# Patient Record
Sex: MALE | Race: WHITE | HISPANIC OR LATINO | Employment: UNEMPLOYED | URBAN - METROPOLITAN AREA
[De-identification: names, ages, dates, MRNs, and addresses within clinical notes are randomized per-mention and may not be internally consistent; named-entity substitution may affect disease eponyms.]

---

## 2018-04-02 ENCOUNTER — HOSPITAL ENCOUNTER (EMERGENCY)
Facility: MEDICAL CENTER | Age: 58
End: 2018-04-02
Attending: EMERGENCY MEDICINE

## 2018-04-02 VITALS
HEIGHT: 68 IN | OXYGEN SATURATION: 98 % | DIASTOLIC BLOOD PRESSURE: 109 MMHG | TEMPERATURE: 98.3 F | BODY MASS INDEX: 35.68 KG/M2 | SYSTOLIC BLOOD PRESSURE: 186 MMHG | WEIGHT: 235.45 LBS | RESPIRATION RATE: 16 BRPM | HEART RATE: 63 BPM

## 2018-04-02 DIAGNOSIS — I16.0 HYPERTENSIVE URGENCY: ICD-10-CM

## 2018-04-02 PROCEDURE — 700102 HCHG RX REV CODE 250 W/ 637 OVERRIDE(OP): Performed by: EMERGENCY MEDICINE

## 2018-04-02 PROCEDURE — 99283 EMERGENCY DEPT VISIT LOW MDM: CPT

## 2018-04-02 PROCEDURE — A9270 NON-COVERED ITEM OR SERVICE: HCPCS | Performed by: EMERGENCY MEDICINE

## 2018-04-02 RX ORDER — CLONIDINE HYDROCHLORIDE 0.1 MG/1
0.2 TABLET ORAL ONCE
Status: COMPLETED | OUTPATIENT
Start: 2018-04-02 | End: 2018-04-02

## 2018-04-02 RX ORDER — CLONIDINE HYDROCHLORIDE 0.2 MG/1
0.2 TABLET ORAL
Qty: 30 TAB | Refills: 0 | Status: SHIPPED | OUTPATIENT
Start: 2018-04-02 | End: 2018-05-02

## 2018-04-02 RX ADMIN — CLONIDINE HYDROCHLORIDE 0.2 MG: 0.1 TABLET ORAL at 13:30

## 2018-04-02 ASSESSMENT — PAIN SCALES - GENERAL: PAINLEVEL_OUTOF10: 4

## 2018-04-02 NOTE — ED TRIAGE NOTES
Ambulates to triage  Chief Complaint   Patient presents with   • Hypertension     has been taking his BP meds, has a headache, no vision changes     Denies any CP, took his meds this morning, has not had any changes in meds recently.

## 2018-04-02 NOTE — DISCHARGE INSTRUCTIONS
Hipertensión  (Hypertension)  La hipertensión, conocida comúnmente yoko presión arterial jazzmine, se produce cuando la jef bombea en las arterias con mucha fuerza. Las arterias son los vasos sanguíneos que transportan la jef desde el corazón hacia todas las partes del cuerpo. Haleigh lectura de la presión arterial consiste en un número más alto sobre un número más bajo, por ejemplo, 110/72. El número más alto (presión sistólica) corresponde a la presión interna de las arterias cuando el corazón bombea jef. El número más bajo (presión diastólica) corresponde a la presión interna de las arterias cuando el corazón se relaja. En condiciones ideales, la presión arterial debe ser inferior a 120/80.  La hipertensión fuerza al corazón a trabajar más para bombear la jef. Las arterias pueden estrecharse o ponerse rígidas. La hipertensión no tratada o no controlada puede causar infarto de miocardio, ictus, enfermedad renal y otros problemas.  FACTORES DE RIESGO  Algunos factores de riesgo de hipertensión son controlables, inna otros no lo son.  Entre los factores de riesgo que usted no puede controlar, se incluyen los siguientes:  · La braeden. El riesgo es mayor para las personas afroamericanas.  · La edad. Los riesgos aumentan con la edad.  · El sexo. Antes de los 45 años, los hombres corren más riesgo que las mujeres. Después de los 65 años, las mujeres corren más riesgo que los hombres.  Entre los factores de riesgo que usted puede controlar, se incluyen los siguientes:  · No hacer la cantidad suficiente de actividad física o ejercicio.  · Tener sobrepeso.  · Consumir mucha grasa, azúcar, calorías o sal en la dieta.  · Beber alcohol en exceso.  SIGNOS Y SÍNTOMAS  Por lo general, la hipertensión no causa signos o síntomas. La hipertensión arterial demasiado jazzmine (crisis hipertensiva) puede causar dolor de regina, ansiedad, falta de aire y hemorragia nasal.  DIAGNÓSTICO  Para detectar si usted tiene hipertensión, el médico  le medirá la presión arterial mientras esté sentado, con el brazo levantado a la altura del corazón. Debe medirla al menos dos veces en el mismo brazo. Determinadas condiciones pueden causar shelby diferencia de presión arterial entre el brazo lisandro y el derecho. El hecho de tener shelby bridgette lectura de la presión arterial más jazzmine que lo normal no significa que necesita un tratamiento. Si no está nikki si tiene hipertensión arterial, es posible que se le pida que regrese otro día para volver a controlarle la presión arterial. O maría se le puede pedir que se controle la presión arterial en crawford casa blanca 1 o más meses.  TRATAMIENTO  El tratamiento de la hipertensión arterial incluye hacer cambios en el estilo de rossy y, posiblemente, sierra medicamentos. Un estilo de rossy saludable puede ayudar a bajar la presión arterial jazzmine. Quizá deba cambiar algunos hábitos.  Los cambios en el estilo de rossy pueden incluir lo siguiente:  · Seguir la dieta DASH. Esta dieta tiene un alto contenido de frutas, verduras y cereales integrales. Incluye poca cantidad de sal, olivia powell y azúcares agregados.  · Mantenga el consumo de sodio por debajo de 2 300 mg por día.  · Realizar al menos entre 30 y 45 minutos de ejercicio aeróbico, 4 veces por semana yoko mínimo.  · Perder peso, si es necesario.  · No fumar.  · Limitar el consumo de bebidas alcohólicas.  · Aprender formas de reducir el estrés.  El médico puede recetarle medicamentos si los cambios en el estilo de rossy no son suficientes para lograr controlar la presión arterial y si shelby de las siguientes afirmaciones es verdadera:  · Tiene entre 18 y 59 años y crawford presión arterial sistólica está por encima de 140.  · Tiene 60 años o más y crawford presión arterial sistólica está por encima de 150.  · Crawford presión arterial diastólica está por encima de 90.  · Tiene diabetes y crawford presión arterial sistólica está por encima de 140 o crawford presión arterial diastólica está por encima de 90.  · Tiene  shelby enfermedad renal y myrick presión arterial está por encima de 140/90.  · Tiene shelby enfermedad cardíaca y ymrick presión arterial está por encima de 140/90.  La presión arterial deseada puede variar en función de las enfermedades, la edad y otros factores personales.  INSTRUCCIONES PARA EL CUIDADO EN EL HOGAR  · Judi que le midan de nuevo la presión arterial según las indicaciones del médico.  · Onamia los medicamentos solamente yoko se lo haya indicado el médico. Siga cuidadosamente las indicaciones. Los medicamentos para la presión arterial deben tomarse según las indicaciones. Los medicamentos pierden eficacia al omitir las dosis. El hecho de omitir las dosis también aumenta el riesgo de otros problemas.  · No fume.  · Contrólese la presión arterial en myrick casa según las indicaciones del médico.  SOLICITE ATENCIÓN MÉDICA SI:  · Piensa que tiene shelby reacción alérgica a los medicamentos.  · Tiene mareos o liz de regina con recurrencia.  · Tiene hinchazón en los tobillos.  · Tiene problemas de visión.  SOLICITE ATENCIÓN MÉDICA DE INMEDIATO SI:  · Siente un dolor de regina intenso o confusión.  · Siente debilidad inusual, adormecimiento o que se desmayará.  · Siente dolor intenso en el pecho o en el abdomen.  · Vomita repetidas veces.  · Tiene dificultad para respirar.  ASEGÚRESE DE QUE:  · Comprende estas instrucciones.  · Controlará myrick afección.  · Recibirá ayuda de inmediato si no mejora o si empeora.  Esta información no tiene yoko fin reemplazar el consejo del médico. Asegúrese de hacerle al médico cualquier pregunta que tenga.  Document Released: 12/18/2006 Document Revised: 05/03/2016 Document Reviewed: 10/10/2014  [employer] Interactive Patient Education © 2017 [employer]

## 2018-04-02 NOTE — ED PROVIDER NOTES
"ED Provider Note    CHIEF COMPLAINT  Chief Complaint   Patient presents with   • Hypertension     has been taking his BP meds, has a headache, no vision changes       HPI  Jean Fowler is a 57 y.o. male who presents for evaluation of hypertension. Patient is primarily [place]i-speaking and his wife is here and is working as an . He has history of hypertension. He has been on lisinopril and metoprolol for approximately 8 years. He's been checking his blood pressure according to his wife at times it's been quite elevated with a systolic above 200. He has a very slight headache. Denies visual changes. He has no chest pain or shortness of breath. He is followed at the [facility]. He states that he has not been to see them for a while. He has been taking his lisinopril and metoprolol as prescribed.    REVIEW OF SYSTEMS  See HPI for further details. All other systems are negative.     PAST MEDICAL HISTORY  No past medical history on file.    FAMILY HISTORY  No family history on file.    SOCIAL HISTORY  Social History     Social History   • Marital status: N/A     Spouse name: N/A   • Number of children: N/A   • Years of education: N/A     Social History Main Topics   • Smoking status: Not on file   • Smokeless tobacco: Not on file   • Alcohol use Not on file   • Drug use: Unknown   • Sexual activity: Not on file     Other Topics Concern   • Not on file     Social History Narrative   • No narrative on file       SURGICAL HISTORY  No past surgical history on file.    CURRENT MEDICATIONS  Home Medications    **Home medications have not yet been reviewed for this encounter**         ALLERGIES  No Known Allergies    PHYSICAL EXAM  VITAL SIGNS: BP (!) 186/109   Pulse 72   Temp 36.8 °C (98.3 °F)   Resp 16   Ht 1.727 m (5' 8\")   Wt 106.8 kg (235 lb 7.2 oz)   SpO2 96%   BMI 35.80 kg/m²     Constitutional: Well developed, Well nourished, No acute distress, Non-toxic appearance.   HENT: Normocephalic, " Atraumatic.   Eyes:  EOMI, Conjunctiva normal, No discharge.   Cardiovascular: Normal heart rate, Normal rhythm, No murmurs, No rubs, No gallops.   Thorax & Lungs: Lungs clear to auscultation bilaterally without wheezes, rales or rhonchi. No respiratory distress.   Skin: Warm, Dry.   Extremities:  No edema, No cyanosis. No calf tenderness or swelling.  Musculoskeletal: Good range of motion in all major joints.  Neurologic: Awake alert, No focal deficits noted.       COURSE & MEDICAL DECISION MAKING  Pertinent Labs & Imaging studies reviewed. (See chart for details)  This is a 57-year-old here for evaluation of high blood pressure. He has a very slight headache. He has been taking his lisinopril and metoprolol as prescribed. He's had no chest pain or shortness of breath. His blood pressure is 186/109 on arrival. He is treated with clonidine 0.2 mg by mouth. Upon repeat evaluation his blood pressures come down to 147/97. At this point I do not think the patient requires acute hospitalization. He is followed by the [facility]. I will provide him a prescription for clonidine that he may take 1 time daily as needed for systolic blood pressure of greater than 160. He should follow up with his primary care provider to see if they want to adjust his routine antihypertensives. He is given a discharge instruction sheet on hypertension. He is discharged home in the care of his wife in stable condition.    FINAL IMPRESSION  1. Hypertensive urgency  2.   3.         Electronically signed by: Lars Fox, 4/2/2018 1:08 PM

## 2018-04-02 NOTE — ED NOTES
Daily medications:  lisinopril 40mg  metoprolol 50mg   Metformin 1000mg  trigenta - unsure  Been taking them for about 8 years.

## 2020-07-18 ENCOUNTER — HOSPITAL ENCOUNTER (EMERGENCY)
Facility: MEDICAL CENTER | Age: 60
End: 2020-07-18
Attending: EMERGENCY MEDICINE

## 2020-07-18 VITALS
TEMPERATURE: 97.6 F | WEIGHT: 215 LBS | OXYGEN SATURATION: 93 % | DIASTOLIC BLOOD PRESSURE: 73 MMHG | RESPIRATION RATE: 19 BRPM | BODY MASS INDEX: 32.58 KG/M2 | SYSTOLIC BLOOD PRESSURE: 119 MMHG | HEIGHT: 68 IN | HEART RATE: 79 BPM

## 2020-07-18 DIAGNOSIS — U07.1 COVID-19 VIRUS INFECTION: ICD-10-CM

## 2020-07-18 DIAGNOSIS — R50.81 FEVER IN OTHER DISEASES: ICD-10-CM

## 2020-07-18 PROCEDURE — 99281 EMR DPT VST MAYX REQ PHY/QHP: CPT

## 2020-07-18 SDOH — HEALTH STABILITY: MENTAL HEALTH: HOW OFTEN DO YOU HAVE A DRINK CONTAINING ALCOHOL?: NEVER

## 2020-07-18 NOTE — DISCHARGE INSTRUCTIONS
Please call your primary care physician tomorrow to review your emergency department visit.  Continue to monitor your oxygen as you have been instructed.  If your oxygen drops below 92% please return to the emergency department for recheck.  You may continue to take Tylenol as needed for your fevers.  As we discussed, it is okay to take ibuprofen occasionally for fever if the Tylenol is not working.  Return to the emergency department if you develop any new or worsening symptoms, especially shortness of breath, low oxygen levels, chest pain, lightheadedness, or if you begin to feel significantly worse.  Continue to self isolate until your symptoms and fevers go away completely.

## 2020-07-18 NOTE — ED PROVIDER NOTES
"ED Provider Note    Chief Complaint:   Fevers    HPI:  Jean Fowler is a 59 y.o. male who presents with chief complaint of fevers.  He started developed fevers and body aches 6 days ago, additionally he has occasional left hip pain but states that that has been mild and infrequent.  He has no hip pain at this time.  Due to his symptoms, he was tested for COVID-19 and reports that he tested positive.  He was given a pulse oximeter, and has been monitoring his oxygen levels at home.  He reports that they do not drop below 92%.  His primary concern is that the fevers are recurring.  He does take Tylenol, and states that his fevers improved with that medication.  He is wondering if we have any other medications to give him that will help with his COVID related fevers.  He has no chest pain, no shortness of breath.  He has no lightheadedness.  Otherwise he states that his symptoms are doing quite well.  He does have a history of diabetes, as well as hypertension.  He has no difficulty with weightbearing on the left hip, he is having no hip pain or flank pain at this time, he is able to easily and fully range the left hip.    Review of Systems:  See HPI for pertinent positives and negatives.     Past Medical History:       Social History:  Social History     Tobacco Use   • Smoking status: Never Smoker   Substance and Sexual Activity   • Alcohol use: Never     Frequency: Never   • Drug use: Never   • Sexual activity: Not on file       Surgical History:  patient denies any surgical history    Current Medications:  Home Medications     Reviewed by Karena Zepeda R.N. (Registered Nurse) on 07/18/20 at 1132  Med List Status: Complete   Medication Last Dose Status   Acetaminophen (TYLENOL PO) 7/18/2020 Active                Allergies:  No Known Allergies    Physical Exam:  Vital Signs: /73   Pulse 79   Temp 37.6 °C (99.7 °F) (Oral)   Resp 19   Ht 1.727 m (5' 8\")   Wt 97.5 kg (215 lb)   SpO2 94%   BMI " 32.69 kg/m²   Constitutional: Alert, no acute distress  HENT: Normocephalic  Eyes: Pupils equal and reactive, normal conjunctiva  Neck: Supple, normal range of motion, no stridor  Cardiovascular: Extremities are warm and well perfused  Pulmonary: No respiratory distress, normal work of breathing, no accessory muscule usage, room air oxygen saturation is 95%   Skin: Warm, dry, no rashes or lesions  Musculoskeletal: Normal range of motion in all extremities, no swelling or deformity noted  Neurologic: Alert, oriented, normal speech, normal motor function  Psychiatric: Normal and appropriate mood and affect    Medical records reviewed for continuity of care.  No recent visits for similar symptoms.    MDM:  Patient presents out of concern for recurrent fevers in the setting of confirmed COVID-19 infection.  He has no hip pain, no evidence of septic joint.  He has no chest pain or shortness of breath, no respiratory distress, room air oxygen saturation is 95%.  His fevers do improve with Tylenol, however he is concerned that they are recurring.  I explained that he may continue to have fevers for several days due to COVID-19.  He has no headaches or neck pain, no meningeal signs.  He wants to know if there are any other medications he can try for fever, explained that occasional doses of ibuprofen should be fine.  There is not been any documented evidence to show worsening COVID disease with ibuprofen use.  He will continue to use Tylenol, but may supplement with ibuprofen if it does not seem effective.  Plan is times for discharge home, he is counseled to continue monitoring his oxygen levels, he will call his primary care physician in the morning to discuss his visit today. Return precautions were discussed with the patient, and provided in written form with the patient's discharge instructions.     Blood pressure today is greater than 120/80, patient is instructed to follow up with primary care provider for blood  pressure recheck.    Personal protective equipment including N95 surgical respirator, goggles, face shield, gown, and gloves were used during this encounter.       Disposition:  Discharge home in stable condition    Final Impression:  1. COVID-19 virus infection    2. Fever in other diseases        Electronically signed by: Charleen Kirk MD, 7/18/2020 12:11 PM

## 2020-07-18 NOTE — ED TRIAGE NOTES
".  Chief Complaint   Patient presents with   • Fever     intermittant x 6 days, 99.7 on arrival to ED, taking Tylenol at home, last dose 3 hours ago   • Hip Pain     left, x 2-3 days, denies trauma     ./77   Pulse 82   Temp 37.6 °C (99.7 °F) (Oral)   Resp 16   Ht 1.727 m (5' 8\")   Wt 97.5 kg (215 lb)   SpO2 93%   BMI 32.69 kg/m²     Patient to [facility] from triage with above complaints, language line video  used for triage, patient tested positive (+) for COVID last week, wearing mask, placed on droplet precautions, VSS on RA, oral temp 99.7 F, chart up for ERP.    "

## 2020-07-18 NOTE — ED NOTES
Patient given discharge instructions and follow up information, language line video  use, patient verbalized understanding, ambulatory out of ED w/steady gait.